# Patient Record
Sex: MALE | Race: WHITE | NOT HISPANIC OR LATINO | Employment: FULL TIME | URBAN - METROPOLITAN AREA
[De-identification: names, ages, dates, MRNs, and addresses within clinical notes are randomized per-mention and may not be internally consistent; named-entity substitution may affect disease eponyms.]

---

## 2024-05-24 ENCOUNTER — HOSPITAL ENCOUNTER (EMERGENCY)
Facility: HOSPITAL | Age: 34
Discharge: HOME | End: 2024-05-24
Attending: EMERGENCY MEDICINE
Payer: OTHER MISCELLANEOUS

## 2024-05-24 VITALS
RESPIRATION RATE: 17 BRPM | BODY MASS INDEX: 2.52 KG/M2 | WEIGHT: 19 LBS | TEMPERATURE: 97.7 F | HEIGHT: 73 IN | DIASTOLIC BLOOD PRESSURE: 77 MMHG | SYSTOLIC BLOOD PRESSURE: 130 MMHG | OXYGEN SATURATION: 96 % | HEART RATE: 77 BPM

## 2024-05-24 DIAGNOSIS — Z77.21 PATIENT EXPOSURE TO BLOOD: Primary | ICD-10-CM

## 2024-05-24 PROCEDURE — 99282 EMERGENCY DEPT VISIT SF MDM: CPT

## 2024-05-24 RX ORDER — LOPERAMIDE HYDROCHLORIDE 2 MG/1
2 CAPSULE ORAL 4 TIMES DAILY PRN
COMMUNITY
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
## 2024-07-26 ENCOUNTER — TELEPHONE (OUTPATIENT)
Dept: FAMILY MEDICINE CLINIC | Facility: CLINIC | Age: 34
End: 2024-07-26
Payer: COMMERCIAL

## 2024-07-26 NOTE — TELEPHONE ENCOUNTER
Caller: Beltran Pace    Relationship to patient: Self    Best call back number: [phone]     Chief complaint: ONGOING STOMACH ISSUES    Type of visit: NEW PATIENT    Requested date: ANYTIME    If rescheduling, when is the original appointment: N/A    Additional notes:PATIENT WAS WANTING TO ESTABLISH CARE WITH KRISTEN GRACIA. PATIENT'S MOTHER ELSY PACE REFERRED HIM TO HER. PATIENT RECEIVED A MESSAGE TO CALL BACK TO GET SCHEDULED, BUT  HUB WAS UNABLE TO TRANSFER. PLEASE CALL BACK TO GET SCHEDULED.

## 2024-07-27 NOTE — TELEPHONE ENCOUNTER
I will except him as a new patient.  Not in a hurry to see him.  Next available 2 slot appointment is fine

## 2024-07-30 NOTE — TELEPHONE ENCOUNTER
Caller: Beltran Dimas    Relationship: Self    Best call back number: [phone]     What is the best time to reach you: AFTER 2PM    Who are you requesting to speak with (clinical staff, provider,  specific staff member): FRONT OFFICE STAFF    Do you know the name of the person who called:     What was the call regarding: HUB ATTEMPTED TO WARM TRANSFER TO SCHEDULE BUT DID NOT GET THROUGH. PATIENT NEEDS TO SCHEDULE    Is it okay if the provider responds through MyChart: PHONE CALL

## 2024-07-31 NOTE — TELEPHONE ENCOUNTER
Called patient to schedule appt.  No answer and message states that VM has not been set up yet.  Unable to leave message and will try to call back later

## 2024-08-12 ENCOUNTER — OFFICE VISIT (OUTPATIENT)
Dept: FAMILY MEDICINE CLINIC | Facility: CLINIC | Age: 34
End: 2024-08-12
Payer: COMMERCIAL

## 2024-08-12 VITALS
SYSTOLIC BLOOD PRESSURE: 112 MMHG | TEMPERATURE: 97.9 F | BODY MASS INDEX: 23.46 KG/M2 | WEIGHT: 177 LBS | HEART RATE: 80 BPM | OXYGEN SATURATION: 100 % | HEIGHT: 73 IN | DIASTOLIC BLOOD PRESSURE: 70 MMHG | RESPIRATION RATE: 16 BRPM

## 2024-08-12 DIAGNOSIS — D22.9 ATYPICAL NEVI: ICD-10-CM

## 2024-08-12 DIAGNOSIS — Z11.4 ENCOUNTER FOR SCREENING FOR HIV: ICD-10-CM

## 2024-08-12 DIAGNOSIS — R19.7 DIARRHEA, UNSPECIFIED TYPE: Primary | ICD-10-CM

## 2024-08-12 DIAGNOSIS — Z72.0 TOBACCO ABUSE: ICD-10-CM

## 2024-08-12 DIAGNOSIS — E55.9 VITAMIN D DEFICIENCY: ICD-10-CM

## 2024-08-12 PROCEDURE — 99203 OFFICE O/P NEW LOW 30 MIN: CPT | Performed by: PHYSICIAN ASSISTANT

## 2024-08-12 NOTE — PROGRESS NOTES
"Laurie Dimas is a 33 y.o. male.     History of Present Illness    Since the last visit, he has overall felt tired.  He has Vitamin D deficiency and will update labs for continued management.  he has been compliant with current medications have reviewed them.  The patient denies medication side effects.  Will refill medications. /70   Pulse 80   Temp 97.9 °F (36.6 °C)   Resp 16   Ht 185.4 cm (73\")   Wt 80.3 kg (177 lb)   SpO2 100%   BMI 23.35 kg/m² .  BMI is within normal parameters. No other follow-up for BMI required.    Weight 170-180  Smoker  Dad had bladder cancer  3 kids engaged    Mom--DM, HTN, thyroid    Results for orders placed or performed in visit on 01/05/17   Chem 20 Profile    Specimen: Blood   Result Value Ref Range    Glucose 95 65 - 99 mg/dL    BUN 14 6 - 20 mg/dL    Sodium 144 136 - 145 mmol/L    Potassium 4.4 3.5 - 5.2 mmol/L    Chloride 104 98 - 107 mmol/L    CO2 26.1 22.0 - 29.0 mmol/L    Calcium 9.8 8.6 - 10.5 mg/dL    Albumin 4.90 3.50 - 5.20 g/dL    Total Protein 7.3 6.0 - 8.5 g/dL    AST (SGOT) 22 1 - 40 U/L    ALT (SGPT) 21 1 - 41 U/L    Alkaline Phosphatase 54 39 - 117 U/L    Total Bilirubin 0.5 0.1 - 1.2 mg/dL    Bilirubin, Direct <0.2 0.0 - 0.3 mg/dL    Iron 125 59 - 158 mcg/dL    GGT 15 8 - 61 U/L    Phosphorus 4.6 (H) 2.5 - 4.5 mg/dL     135 - 225 U/L    Uric Acid 4.1 3.4 - 7.0 mg/dL    eGFR Non African Amer 87 >60 mL/min/1.73    eGFR  African Amer 105 >60 mL/min/1.73    Creatinine 1.04 0.76 - 1.27 mg/dL   Lipid Panel    Specimen: Blood   Result Value Ref Range    Total Cholesterol 153 0 - 200 mg/dL    Triglycerides 60 0 - 150 mg/dL    HDL Cholesterol 40 40 - 60 mg/dL    LDL Cholesterol  101 (H) 0 - 100 mg/dL    VLDL Cholesterol 12 5 - 40 mg/dL    LDL/HDL Ratio 2.53    CBC Auto Differential    Specimen: Blood   Result Value Ref Range    WBC 9.27 4.50 - 10.70 10*3/mm3    RBC 5.26 4.60 - 6.00 10*6/mm3    Hemoglobin 15.7 13.7 - 17.6 g/dL    " Hematocrit 49.4 40.4 - 52.2 %    MCV 93.9 79.8 - 96.2 fL    MCH 29.8 27.0 - 32.7 pg    MCHC 31.8 (L) 32.6 - 36.4 g/dL    RDW 12.4 11.5 - 14.5 %    RDW-SD 42.7 37.0 - 54.0 fl    MPV 11.3 6.0 - 12.0 fL    Platelets 294 140 - 500 10*3/mm3    Neutrophil % 54.2 42.7 - 76.0 %    Lymphocyte % 34.0 19.6 - 45.3 %    Monocyte % 6.8 5.0 - 12.0 %    Eosinophil % 3.8 0.3 - 6.2 %    Basophil % 0.9 0.0 - 1.5 %    Immature Grans % 0.3 0.0 - 0.5 %    Neutrophils, Absolute 5.03 1.90 - 8.10 10*3/mm3    Lymphocytes, Absolute 3.15 0.90 - 4.80 10*3/mm3    Monocytes, Absolute 0.63 0.20 - 1.20 10*3/mm3    Eosinophils, Absolute 0.35 0.00 - 0.70 10*3/mm3    Basophils, Absolute 0.08 0.00 - 0.20 10*3/mm3    Immature Grans, Absolute 0.03 0.00 - 0.03 10*3/mm3   Urinalysis With / Microscopic If Indicated    Specimen: Urine, Clean Catch   Result Value Ref Range    Color, UA Yellow Yellow, Straw    Appearance, UA Clear Clear    pH, UA 6.0 5.0 - 8.0    Specific Gravity, UA 1.022 1.005 - 1.030    Glucose, UA Negative Negative    Ketones, UA Negative Negative    Bilirubin, UA Negative Negative    Blood, UA Negative Negative    Protein, UA Negative Negative    Leuk Esterase, UA Negative Negative    Nitrite, UA Negative Negative    Urobilinogen, UA 0.2 E.U./dL 0.2 - 1.0 E.U./dL     Went to ER 6-4-22 for migraine  Labs and CT head negative  CTA chest 6-17-21  IMPRESSION:     1. There are left lower lobe and to a lesser extent right middle and upper lobe infiltrates, which are suspected to be related to pneumonia.   2. Diffuse bronchial wall thickening, most pronounced in the left lower lobe, suggesting superimposed bronchitis.   3. There is a tiny, 2 mm subpleural nodule in the right middle lobe as well as an additional 4 mm nodule in the right middle lobe. Given the patient's age, these are likely infectious/inflammatory in nature. If the patient has significant risk factors   for malignancy, a follow-up CT chest could be performed in 12 months.   He  does smoke-----I do advise f/u CT chest    3 kids, not -----engaged.  CT abd and pelvis 2-20-13---appendectomy    Had blood exposure at work---5-24-24  He does agree f/u on HIV and hep A,B,C    Beltran Dimas 33 y.o. male who presents for evaluation of  diarrhea----for about 10 yrs---has most days----can be solid formed stool. No GERD. Can have up to 10 stools in a day at times. Tried diet changes---sometimes is dairy. No blood or black.  Can have abd cramping----1/4 of episodes--not severe. . Symptoms have been present for 10 years .  The condition is aggravated by  stress, dairy mexican---foreign foods, coffee.   . he is experiencing fatigue.  Alleviating factors are imodium with  some help at times  . Patient denies fever, constipation, melena, bright red blood in stool, and reflux his past medical history is notable for no prior issures.  Patient denies recent travel.        The following portions of the patient's history were reviewed and updated as appropriate: allergies, current medications, past family history, past medical history, past social history, past surgical history, and problem list.    Review of Systems   Constitutional:  Positive for fatigue. Negative for diaphoresis.   HENT:  Negative for nosebleeds and trouble swallowing.    Eyes:  Negative for blurred vision and visual disturbance.   Respiratory:  Negative for choking.    Gastrointestinal:  Positive for diarrhea. Negative for blood in stool and constipation.   Allergic/Immunologic: Negative for immunocompromised state.   Neurological:  Negative for facial asymmetry and speech difficulty.   Psychiatric/Behavioral:  Negative for self-injury and suicidal ideas.        Objective   Physical Exam  Vitals and nursing note reviewed.   Constitutional:       General: He is not in acute distress.     Appearance: Normal appearance. He is well-developed. He is not diaphoretic.   HENT:      Head: Normocephalic.      Right Ear: External ear normal.       Left Ear: External ear normal.   Eyes:      Conjunctiva/sclera: Conjunctivae normal.      Pupils: Pupils are equal, round, and reactive to light.   Cardiovascular:      Rate and Rhythm: Normal rate and regular rhythm.      Heart sounds: Normal heart sounds. No murmur heard.  Pulmonary:      Effort: Pulmonary effort is normal.      Breath sounds: Normal breath sounds.   Musculoskeletal:         General: Normal range of motion.      Cervical back: Normal range of motion and neck supple.      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      Findings: Lesion present. No rash.      Comments: Left flank area has 2 mm skin tag type brown nevi looks irritated---refer derm   Neurological:      Mental Status: He is alert and oriented to person, place, and time.   Psychiatric:         Mood and Affect: Mood normal. Affect is not inappropriate.         Behavior: Behavior normal.         Thought Content: Thought content normal.         Judgment: Judgment normal.           Assessment & Plan   Diagnoses and all orders for this visit:    1. Diarrhea, unspecified type (Primary)  -     Comprehensive metabolic panel  -     Lipid panel  -     CBC and Differential  -     TSH  -     Hemoglobin A1c  -     Vitamin D,25-Hydroxy  -     Vitamin B12  -     Folate  -     Urinalysis With Microscopic - Urine, Clean Catch  -     T4, Free  -     Magnesium  -     Celiac Disease Panel  -     Hepatitis Panel, Acute  -     HIV-1 / O / 2 Ag / Antibody  -     Sedimentation Rate  -     C-reactive Protein    2. Vitamin D deficiency  -     Comprehensive metabolic panel  -     Lipid panel  -     CBC and Differential  -     TSH  -     Hemoglobin A1c  -     Vitamin D,25-Hydroxy  -     Vitamin B12  -     Folate  -     Urinalysis With Microscopic - Urine, Clean Catch  -     T4, Free  -     Magnesium  -     Celiac Disease Panel  -     Hepatitis Panel, Acute  -     HIV-1 / O / 2 Ag / Antibody  -      Sedimentation Rate  -     C-reactive Protein    3. Tobacco abuse  -     Comprehensive metabolic panel  -     Lipid panel  -     CBC and Differential  -     TSH  -     Hemoglobin A1c  -     Vitamin D,25-Hydroxy  -     Vitamin B12  -     Folate  -     Urinalysis With Microscopic - Urine, Clean Catch  -     T4, Free  -     Magnesium  -     Celiac Disease Panel  -     Hepatitis Panel, Acute  -     HIV-1 / O / 2 Ag / Antibody  -     Sedimentation Rate  -     C-reactive Protein    4. Encounter for screening for HIV  -     Comprehensive metabolic panel  -     Lipid panel  -     CBC and Differential  -     TSH  -     Hemoglobin A1c  -     Vitamin D,25-Hydroxy  -     Vitamin B12  -     Folate  -     Urinalysis With Microscopic - Urine, Clean Catch  -     T4, Free  -     Magnesium  -     Celiac Disease Panel  -     Hepatitis Panel, Acute  -     HIV-1 / O / 2 Ag / Antibody  -     Sedimentation Rate  -     C-reactive Protein        Consider Prevnar 20  Labs and get Celiac blood panel  Refer to GI  Update labs  Screening labs  Stop smoking  Irritated nevi left flank area refer to Derm  Had CT of chest 2021 had micronodules and with being a smoker do recommend follow-up CT and he declines for now but will let me know if he decides to schedule